# Patient Record
Sex: FEMALE | Race: WHITE | NOT HISPANIC OR LATINO | URBAN - METROPOLITAN AREA
[De-identification: names, ages, dates, MRNs, and addresses within clinical notes are randomized per-mention and may not be internally consistent; named-entity substitution may affect disease eponyms.]

---

## 2024-08-05 ENCOUNTER — APPOINTMENT (OUTPATIENT)
Facility: HOSPITAL | Age: 24
End: 2024-08-05
Payer: MEDICAID

## 2024-08-05 ENCOUNTER — HOSPITAL ENCOUNTER (EMERGENCY)
Facility: HOSPITAL | Age: 24
Discharge: HOME | End: 2024-08-05
Attending: EMERGENCY MEDICINE | Admitting: EMERGENCY MEDICINE
Payer: MEDICAID

## 2024-08-05 VITALS
RESPIRATION RATE: 16 BRPM | OXYGEN SATURATION: 100 % | TEMPERATURE: 97.7 F | DIASTOLIC BLOOD PRESSURE: 78 MMHG | BODY MASS INDEX: 19.61 KG/M2 | WEIGHT: 122 LBS | HEART RATE: 60 BPM | HEIGHT: 66 IN | SYSTOLIC BLOOD PRESSURE: 122 MMHG

## 2024-08-05 DIAGNOSIS — S00.83XA CONTUSION OF FACE, INITIAL ENCOUNTER: ICD-10-CM

## 2024-08-05 DIAGNOSIS — F07.81 POSTCONCUSSION SYNDROME: Primary | ICD-10-CM

## 2024-08-05 LAB
ALBUMIN SERPL-MCNC: 4.2 G/DL (ref 3.5–5.2)
ALBUMIN/GLOB SERPL: 1.8 G/DL
ALP SERPL-CCNC: 52 U/L (ref 39–117)
ALT SERPL W P-5'-P-CCNC: 14 U/L (ref 1–33)
ANION GAP SERPL CALCULATED.3IONS-SCNC: 10.2 MMOL/L (ref 5–15)
AST SERPL-CCNC: 34 U/L (ref 1–32)
B-HCG UR QL: NEGATIVE
BASOPHILS # BLD AUTO: 0.02 10*3/MM3 (ref 0–0.2)
BASOPHILS NFR BLD AUTO: 0.3 % (ref 0–1.5)
BILIRUB SERPL-MCNC: 2 MG/DL (ref 0–1.2)
BUN SERPL-MCNC: 12 MG/DL (ref 6–20)
BUN/CREAT SERPL: 17.4 (ref 7–25)
CALCIUM SPEC-SCNC: 8.9 MG/DL (ref 8.6–10.5)
CHLORIDE SERPL-SCNC: 102 MMOL/L (ref 98–107)
CO2 SERPL-SCNC: 22.8 MMOL/L (ref 22–29)
CREAT SERPL-MCNC: 0.69 MG/DL (ref 0.57–1)
DEPRECATED RDW RBC AUTO: 42 FL (ref 37–54)
EGFRCR SERPLBLD CKD-EPI 2021: 124.5 ML/MIN/1.73
EOSINOPHIL # BLD AUTO: 0.11 10*3/MM3 (ref 0–0.4)
EOSINOPHIL NFR BLD AUTO: 1.5 % (ref 0.3–6.2)
ERYTHROCYTE [DISTWIDTH] IN BLOOD BY AUTOMATED COUNT: 12.1 % (ref 12.3–15.4)
EXPIRATION DATE: NORMAL
GLOBULIN UR ELPH-MCNC: 2.3 GM/DL
GLUCOSE SERPL-MCNC: 97 MG/DL (ref 65–99)
HCT VFR BLD AUTO: 37.3 % (ref 34–46.6)
HGB BLD-MCNC: 12.9 G/DL (ref 12–15.9)
IMM GRANULOCYTES # BLD AUTO: 0.01 10*3/MM3 (ref 0–0.05)
IMM GRANULOCYTES NFR BLD AUTO: 0.1 % (ref 0–0.5)
INTERNAL NEGATIVE CONTROL: NORMAL
INTERNAL POSITIVE CONTROL: NORMAL
LYMPHOCYTES # BLD AUTO: 1.99 10*3/MM3 (ref 0.7–3.1)
LYMPHOCYTES NFR BLD AUTO: 27.2 % (ref 19.6–45.3)
Lab: NORMAL
MCH RBC QN AUTO: 32 PG (ref 26.6–33)
MCHC RBC AUTO-ENTMCNC: 34.6 G/DL (ref 31.5–35.7)
MCV RBC AUTO: 92.6 FL (ref 79–97)
MONOCYTES # BLD AUTO: 0.68 10*3/MM3 (ref 0.1–0.9)
MONOCYTES NFR BLD AUTO: 9.3 % (ref 5–12)
NEUTROPHILS NFR BLD AUTO: 4.51 10*3/MM3 (ref 1.7–7)
NEUTROPHILS NFR BLD AUTO: 61.6 % (ref 42.7–76)
PLATELET # BLD AUTO: 189 10*3/MM3 (ref 140–450)
PMV BLD AUTO: 10.9 FL (ref 6–12)
POTASSIUM SERPL-SCNC: 4.1 MMOL/L (ref 3.5–5.2)
PROT SERPL-MCNC: 6.5 G/DL (ref 6–8.5)
RBC # BLD AUTO: 4.03 10*6/MM3 (ref 3.77–5.28)
SODIUM SERPL-SCNC: 135 MMOL/L (ref 136–145)
WBC NRBC COR # BLD AUTO: 7.32 10*3/MM3 (ref 3.4–10.8)

## 2024-08-05 PROCEDURE — 70486 CT MAXILLOFACIAL W/O DYE: CPT

## 2024-08-05 PROCEDURE — 81025 URINE PREGNANCY TEST: CPT | Performed by: PHYSICIAN ASSISTANT

## 2024-08-05 PROCEDURE — 25510000001 IOPAMIDOL 61 % SOLUTION: Performed by: EMERGENCY MEDICINE

## 2024-08-05 PROCEDURE — 71260 CT THORAX DX C+: CPT

## 2024-08-05 PROCEDURE — 70450 CT HEAD/BRAIN W/O DYE: CPT

## 2024-08-05 PROCEDURE — 74177 CT ABD & PELVIS W/CONTRAST: CPT

## 2024-08-05 PROCEDURE — 80053 COMPREHEN METABOLIC PANEL: CPT | Performed by: PHYSICIAN ASSISTANT

## 2024-08-05 PROCEDURE — 72125 CT NECK SPINE W/O DYE: CPT

## 2024-08-05 PROCEDURE — 85025 COMPLETE CBC W/AUTO DIFF WBC: CPT | Performed by: PHYSICIAN ASSISTANT

## 2024-08-05 PROCEDURE — 99285 EMERGENCY DEPT VISIT HI MDM: CPT

## 2024-08-05 RX ADMIN — IOPAMIDOL 85 ML: 612 INJECTION, SOLUTION INTRAVENOUS at 11:13

## 2024-08-05 NOTE — [facility] PROVIDER NOTE
"Subjective  History of Present Illness:    Patient is a 24-year-old female presenting to the emergency department after a fall from a horse.  Injury occurred at 8 AM yesterday.  She states she was attempting to jump a fence and the horse hit the fence, falling over.  She states she went to the ground with a horse and then it rolled on top of her.  She states she was pinned facedown on the ground.  She reports a head injury without loss of consciousness.  She states she had black around her vision and since then has seen stars.  She reports an ongoing headache.  She also complains of pain in her right chest, abdomen, neck.  She states her face has been swollen and complains of nose pain.  She does report a nosebleed at time of injury.     Nurses Notes reviewed and agree, including vitals, allergies, social history and prior medical history.     REVIEW OF SYSTEMS: All systems reviewed and not pertinent unless noted.  Review of Systems   Gastrointestinal:  Positive for abdominal pain.   Neurological:  Positive for headaches.   All other systems reviewed and are negative.      No past medical history on file.    Allergies:    Patient has no known allergies.      No past surgical history on file.      Social History     Socioeconomic History    Marital status: Single         No family history on file.    Objective  Physical Exam:  /78 (BP Location: Left arm, Patient Position: Sitting)   Pulse 60   Temp 97.7 °F (36.5 °C) (Oral)   Resp 16   Ht 167.6 cm (66\")   Wt 55.3 kg (122 lb)   LMP 07/15/2024 (Approximate)   SpO2 100%   BMI 19.69 kg/m²      Physical Exam  Vitals and nursing note reviewed.   Constitutional:       Appearance: Normal appearance. She is normal weight.   HENT:      Head: Normocephalic and atraumatic.      Nose: Nose normal.   Eyes:      Extraocular Movements: Extraocular movements intact.      Pupils: Pupils are equal, round, and reactive to light.   Neck:      Comments: Midline tenderness " noted to cervical spine  Cardiovascular:      Rate and Rhythm: Normal rate.   Pulmonary:      Effort: Pulmonary effort is normal.   Abdominal:      General: Abdomen is flat.      Palpations: Abdomen is soft.      Tenderness: There is abdominal tenderness in the right upper quadrant and right lower quadrant.      Comments: Ecchymosis noted to right lower quadrant   Musculoskeletal:         General: Normal range of motion.      Cervical back: Normal range of motion.   Skin:     General: Skin is warm and dry.   Neurological:      General: No focal deficit present.      Mental Status: She is alert and oriented to person, place, and time. Mental status is at baseline.   Psychiatric:         Mood and Affect: Mood normal.         Behavior: Behavior normal.         Thought Content: Thought content normal.         Judgment: Judgment normal.         Procedures    ED Course:     Presents with ongoing headache and visual changes following a head injury yesterday.  Patient was riding her horse, which fell, knocking her to the ground and then rolling on top of her.  CT imaging of the head, facial bones, C-spine, chest, abdomen were negative for acute findings.  Patient's ongoing symptoms consistent with postconcussive syndrome.  She was advised to refrain from riding until cleared by PCP.  Patient understands and agrees with plan of care.  She is well-appearing with stable vitals at time of discharge    Lab Results (last 24 hours)       Procedure Component Value Units Date/Time    CBC & Differential [[id number]]  (Abnormal) Collected: 08/05/24 1022    Specimen: Blood Updated: 08/05/24 1030    Narrative:      The following orders were created for panel order CBC & Differential.  Procedure                               Abnormality         Status                     ---------                               -----------         ------                     CBC Auto Differential[[id number]]        Abnormal            Final result                  Please view results for these tests on the individual orders.    Comprehensive Metabolic Panel [[id number]]  (Abnormal) Collected: 08/05/24 1022    Specimen: Blood Updated: 08/05/24 1051     Glucose 97 mg/dL      BUN 12 mg/dL      Creatinine 0.69 mg/dL      Sodium 135 mmol/L      Potassium 4.1 mmol/L      Chloride 102 mmol/L      CO2 22.8 mmol/L      Calcium 8.9 mg/dL      Total Protein 6.5 g/dL      Albumin 4.2 g/dL      ALT (SGPT) 14 U/L      AST (SGOT) 34 U/L      Alkaline Phosphatase 52 U/L      Total Bilirubin 2.0 mg/dL      Globulin 2.3 gm/dL      A/G Ratio 1.8 g/dL      BUN/Creatinine Ratio 17.4     Anion Gap 10.2 mmol/L      eGFR 124.5 mL/min/1.73     Narrative:      GFR Normal >60  Chronic Kidney Disease <60  Kidney Failure <15      CBC Auto Differential [[id number]]  (Abnormal) Collected: 08/05/24 1022    Specimen: Blood Updated: 08/05/24 1030     WBC 7.32 10*3/mm3      RBC 4.03 10*6/mm3      Hemoglobin 12.9 g/dL      Hematocrit 37.3 %      MCV 92.6 fL      MCH 32.0 pg      MCHC 34.6 g/dL      RDW 12.1 %      RDW-SD 42.0 fl      MPV 10.9 fL      Platelets 189 10*3/mm3      Neutrophil % 61.6 %      Lymphocyte % 27.2 %      Monocyte % 9.3 %      Eosinophil % 1.5 %      Basophil % 0.3 %      Immature Grans % 0.1 %      Neutrophils, Absolute 4.51 10*3/mm3      Lymphocytes, Absolute 1.99 10*3/mm3      Monocytes, Absolute 0.68 10*3/mm3      Eosinophils, Absolute 0.11 10*3/mm3      Basophils, Absolute 0.02 10*3/mm3      Immature Grans, Absolute 0.01 10*3/mm3     POC Urine Pregnancy [[id number]]  (Normal) Collected: 08/05/24 1035    Specimen: Urine Updated: 08/05/24 1036     HCG, Urine, QL Negative     Lot Number [id number]     Internal Positive Control Passed     Internal Negative Control Passed     Expiration Date 2025-01-28             CT Head Without Contrast    Result Date: 8/5/2024  CT HEAD WO CONTRAST, CT FACIAL BONES WO CONTRAST, CT CERVICAL SPINE WO CONTRAST Date of Exam: 8/5/2024 10:51 AM EDT Indication:  trauma. Comparison: None available. Technique: Axial CT images were obtained of the head, face and cervical spine without contrast administration.  Automated exposure control and iterative construction methods were used. Findings: CT head: Gray-white differentiation is maintained and there is no evidence of intracranial hemorrhage, mass or mass effect. The ventricles are normal in size and configuration. The orbits are normal in size and configuration. The paranasal sinuses appear  clear. The calvarium is intact. CT face: The superficial facial soft tissues are normal. There is no acute facial bone fracture. The orbits are normal. CT cervical spine: Cortical margins are intact and vertebral body heights are maintained without evidence of acute fracture. The dens is intact. Alignment is maintained without evidence of traumatic listhesis or subluxation. The paraspinal soft tissues demonstrate no acute or suspicious findings. The lung apices are clear.     Impression: Impression: No acute intracranial abnormality. No acute facial bone fracture. No acute fracture or traumatic malalignment of the cervical spine. Electronically Signed: Phil Lees MD  8/5/2024 11:34 AM EDT  Workstation ID: [id number]    CT Facial Bones Without Contrast    Result Date: 8/5/2024  CT HEAD WO CONTRAST, CT FACIAL BONES WO CONTRAST, CT CERVICAL SPINE WO CONTRAST Date of Exam: 8/5/2024 10:51 AM EDT Indication: trauma. Comparison: None available. Technique: Axial CT images were obtained of the head, face and cervical spine without contrast administration.  Automated exposure control and iterative construction methods were used. Findings: CT head: Gray-white differentiation is maintained and there is no evidence of intracranial hemorrhage, mass or mass effect. The ventricles are normal in size and configuration. The orbits are normal in size and configuration. The paranasal sinuses appear  clear. The calvarium is intact. CT face: The superficial  facial soft tissues are normal. There is no acute facial bone fracture. The orbits are normal. CT cervical spine: Cortical margins are intact and vertebral body heights are maintained without evidence of acute fracture. The dens is intact. Alignment is maintained without evidence of traumatic listhesis or subluxation. The paraspinal soft tissues demonstrate no acute or suspicious findings. The lung apices are clear.     Impression: Impression: No acute intracranial abnormality. No acute facial bone fracture. No acute fracture or traumatic malalignment of the cervical spine. Electronically Signed: Phil Lees MD  8/5/2024 11:34 AM EDT  Workstation ID: [id number]    CT Cervical Spine Without Contrast    Result Date: 8/5/2024  CT HEAD WO CONTRAST, CT FACIAL BONES WO CONTRAST, CT CERVICAL SPINE WO CONTRAST Date of Exam: 8/5/2024 10:51 AM EDT Indication: trauma. Comparison: None available. Technique: Axial CT images were obtained of the head, face and cervical spine without contrast administration.  Automated exposure control and iterative construction methods were used. Findings: CT head: Gray-white differentiation is maintained and there is no evidence of intracranial hemorrhage, mass or mass effect. The ventricles are normal in size and configuration. The orbits are normal in size and configuration. The paranasal sinuses appear  clear. The calvarium is intact. CT face: The superficial facial soft tissues are normal. There is no acute facial bone fracture. The orbits are normal. CT cervical spine: Cortical margins are intact and vertebral body heights are maintained without evidence of acute fracture. The dens is intact. Alignment is maintained without evidence of traumatic listhesis or subluxation. The paraspinal soft tissues demonstrate no acute or suspicious findings. The lung apices are clear.     Impression: Impression: No acute intracranial abnormality. No acute facial bone fracture. No acute fracture or  traumatic malalignment of the cervical spine. Electronically Signed: Phil Lees MD  8/5/2024 11:34 AM EDT  Workstation ID: [id number]    CT Abdomen Pelvis With Contrast    Result Date: 8/5/2024  CT CHEST W CONTRAST DIAGNOSTIC, CT ABDOMEN PELVIS W CONTRAST Date of Exam: 8/5/2024 10:51 AM EDT Indication: trauma. Comparison: None available. Technique: Axial CT images were obtained of the chest, abdomen, and pelvis after the uneventful intravenous administration of 85 mL Isovue-300.  Reconstructed coronal and sagittal images were also obtained. Automated exposure control and iterative construction methods were used. Findings: CT chest: The central airways are patent. No significant bronchial wall thickening or bronchiectasis. No focal airspace consolidation, pulmonary contusion, pleural effusion, or pneumothorax. No suspicious pulmonary nodule or mass. The mediastinum is unremarkable. Heart is normal in size. No evidence of central pulmonary embolism. Normal appearance of the thoracic esophagus. The chest wall soft tissues show no acute abnormality. No evidence of chest wall fracture or suspicious bony lesion. CT abdomen/pelvis: There is no evidence of hepatic laceration or suspicious hepatic lesion. The gallbladder is unremarkable. No significant biliary ductal dilation. The spleen and pancreas show no traumatic abnormality. Adrenal glands are unremarkable. The bilateral kidneys show symmetric enhancement. No hydronephrosis or nephrolithiasis. No suspicious renal lesion. There is no evidence of bowel obstruction. No suspicious lymphadenopathy. The aorta is unremarkable. Portal vein is patent. The urinary bladder is unremarkable. There is variable enhancement of the uterus as well as trace pelvic free fluid which is favored to be physiologic given patient age. A crenulated peripherally enhancing lesion of the right uterus is consistent with a corpus luteum. The abdominal and pelvic wall soft tissues show no acute  abnormality. No fracture or suspicious osseous lesion.     Impression: Impression: No acute abnormality of the chest, abdomen, or pelvis. Variable enhancement of the uterus and trace pelvic free fluid which is favored to be physiologic given patient age. Right ovarian corpus luteum. Electronically Signed: Kvng Burris MD  8/5/2024 11:31 AM EDT  Workstation ID: [id number]    CT Chest With Contrast Diagnostic    Result Date: 8/5/2024  CT CHEST W CONTRAST DIAGNOSTIC, CT ABDOMEN PELVIS W CONTRAST Date of Exam: 8/5/2024 10:51 AM EDT Indication: trauma. Comparison: None available. Technique: Axial CT images were obtained of the chest, abdomen, and pelvis after the uneventful intravenous administration of 85 mL Isovue-300.  Reconstructed coronal and sagittal images were also obtained. Automated exposure control and iterative construction methods were used. Findings: CT chest: The central airways are patent. No significant bronchial wall thickening or bronchiectasis. No focal airspace consolidation, pulmonary contusion, pleural effusion, or pneumothorax. No suspicious pulmonary nodule or mass. The mediastinum is unremarkable. Heart is normal in size. No evidence of central pulmonary embolism. Normal appearance of the thoracic esophagus. The chest wall soft tissues show no acute abnormality. No evidence of chest wall fracture or suspicious bony lesion. CT abdomen/pelvis: There is no evidence of hepatic laceration or suspicious hepatic lesion. The gallbladder is unremarkable. No significant biliary ductal dilation. The spleen and pancreas show no traumatic abnormality. Adrenal glands are unremarkable. The bilateral kidneys show symmetric enhancement. No hydronephrosis or nephrolithiasis. No suspicious renal lesion. There is no evidence of bowel obstruction. No suspicious lymphadenopathy. The aorta is unremarkable. Portal vein is patent. The urinary bladder is unremarkable. There is variable enhancement of the uterus as well as  trace pelvic free fluid which is favored to be physiologic given patient age. A crenulated peripherally enhancing lesion of the right uterus is consistent with a corpus luteum. The abdominal and pelvic wall soft tissues show no acute abnormality. No fracture or suspicious osseous lesion.     Impression: Impression: No acute abnormality of the chest, abdomen, or pelvis. Variable enhancement of the uterus and trace pelvic free fluid which is favored to be physiologic given patient age. Right ovarian corpus luteum. Electronically Signed: Kvng Burris MD  8/5/2024 11:31 AM EDT  Workstation ID: [id number]        MDM     Amount and/or Complexity of Data Reviewed  Clinical lab tests: reviewed  Tests in the radiology section of CPT®: reviewed  Tests in the medicine section of CPT®: reviewed          DDX: includes but is not limited to: Intracranial injury, facial bone fractures, blunt trauma, postconcussive syndrome    Patient arrives POV with vitals interpreted by myself.     Pertinent features from physical exam: No focal neurological deficits on exam.  Tenderness noted to right lower abdomen.        Medications   iopamidol (ISOVUE-300) 61 % injection 100 mL (85 mL Intravenous Given 8/5/24 1113)       Results/clinical rationale were discussed with patient      -----  ED Disposition       ED Disposition   Discharge    Condition   Stable    Comment   --             Final diagnoses:   Postconcussion syndrome   Contusion of face, initial encounter      Your Follow-Up Providers       Provider, No Known.    Follow up details: recheck of symptoms  [facility] [id number]                       Contact information for after-discharge care    Follow-up information has not been specified.                    Your medication list      as of August 5, 2024 12:01 PM     You have not been prescribed any medications.